# Patient Record
Sex: MALE | Race: WHITE | URBAN - METROPOLITAN AREA
[De-identification: names, ages, dates, MRNs, and addresses within clinical notes are randomized per-mention and may not be internally consistent; named-entity substitution may affect disease eponyms.]

---

## 2017-05-04 DIAGNOSIS — C07 CARCINOMA OF PAROTID GLAND (H): Primary | ICD-10-CM

## 2017-06-20 NOTE — PROGRESS NOTES
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  Dr Booker then  brought him back to the operating room 3 days later for a formal parotidectomy and node sampling.  Four nodes were removed, and all four were negative.  There was no residual tumor in the parotid.  The tumor was, therefore, staged as a T2, N0 low-grade acinic cell carcinoma, and he did not have adjuvant therapy.       HISTORY OF PRESENT ILLNESS:  He is now here for a surveillance visit.  Nine month scans are negative.  There is a 3 mm nodule in the right upper apex of the lung that is \"stable\".  We talked about what that meant and that stable is good.  He, however, has noticed a new small lymph node in the left posterior neck that has been present for about four months.  He did not seek attention for it, and we talked about perhaps next time letting us know a little sooner.  Fortunately, it has not grown substantially and does not bother him.      PHYSICAL EXAMINATION:  He looks great.  The facial nerve looks much better; perhaps II/IV weakness now in the marginal distribution and it does not bother him.  The parotid has no palpable nodes.  The mouth is clear.  The neck has a 5-7 mm mobile soft node in mid-level V just posterior to the SCM.  It is definitely palpable but feels benign.  The rest of the jerson basin is negative.  His incisions have healed very nicely.      IMPRESSION AND PLAN:  Mr. Ramirez is about one year status post excision of a low-grade acinic carcinoma with close margins in the left neck.  He has done very well and does not seem to have any evidence of tumor on the nine month scans or my examination.  There is, however, a little focus of adenopathy and he would feel more comfortable having that needled.      It feels benign to me, but " it of course is worrisome for him.  We will see if we can get this done with a cytopathology team today.  If it is too small, we will need to reschedule with an ultrasound-guided biopsy.  If further problems develop, he needs to let me know right away so that we can get him seen by one of my colleagues in [place].      ADDENDUM:  The cytopathology team was able to aspirate the node and the preliminary read is negative, and c/w a benign node, but we will await the final results in 1-2 working days.    Ernesto Jensen M.D.  Otolaryngology/Head & Neck Surgery  [phone]                MD Panfilo David, [facility] Ear, Nose & Throat [address], [address]  [place] [address]      Jermaine Scott MD

## 2017-06-23 ENCOUNTER — OFFICE VISIT (OUTPATIENT)
Dept: OTOLARYNGOLOGY | Facility: CLINIC | Age: 22
End: 2017-06-23

## 2017-06-23 VITALS — BODY MASS INDEX: 21.33 KG/M2 | WEIGHT: 144 LBS | HEIGHT: 69 IN

## 2017-06-23 DIAGNOSIS — C80.1: ICD-10-CM

## 2017-06-23 DIAGNOSIS — R59.9 ENLARGED LYMPH NODES: Primary | ICD-10-CM

## 2017-06-23 DIAGNOSIS — C07 CARCINOMA OF PAROTID GLAND (H): ICD-10-CM

## 2017-06-23 ASSESSMENT — PAIN SCALES - GENERAL: PAINLEVEL: NO PAIN (0)

## 2017-06-23 NOTE — MR AVS SNAPSHOT
After Visit Summary   6/23/2017    Kingston Ramirez    MRN: [id number]           Patient Information     Date Of Birth          1995        Visit Information        Provider Department      6/23/2017 3:30 PM Ernesto Jensen MD [facility] Ear Nose and Throat        Today's Diagnoses     Enlarged lymph nodes    -  1    Carcinoma of parotid gland (H)        Acinic cell adenocarcinoma (H)          Care Instructions    1.  You were seen in the ENT Clinic today by Dr. Jensen.  If you have any questions or concerns after your appointment, please call [phone].  Press option #1 for scheduling related needs.  Press option #3 for Nurse advice.    Chantel DECKER, RN  [facility] ENT   Head & Neck Surgery               Follow-ups after your visit        Who to contact     Please call your clinic at [phone] to:    Ask questions about your health    Make or cancel appointments    Discuss your medicines    Learn about your test results    Speak to your doctor   If you have compliments or concerns about an experience at your clinic, or if you wish to file a complaint, please contact [facility] Physicians Patient Relations at [phone] or email us at [email or web address]         Additional Information About Your Visit        MyCharmobli Information     [employer] gives you secure access to your electronic health record. If you see a primary care provider, you can also send messages to your care team and make appointments. If you have questions, please call your primary care clinic.  If you do not have a primary care provider, please call [phone] and they will assist you.      [employer] is an electronic gateway that provides easy, online access to your medical records. With CAR[employer], you can request a clinic appointment, read your test results, renew a prescription or communicate with your care team.     To access your existing account, please contact your [facility]  "[place] [facility] or call [phone] for assistance.        Care EveryWhere ID     This is your Care EveryWhere ID. This could be used by other organizations to access your [place] medical records  [id number]        Your Vitals Were     Height BMI (Body Mass Index)                1.753 m (5' 9.02\") 21.26 kg/m2           Blood Pressure from Last 3 Encounters:   No data found for BP    Weight from Last 3 Encounters:   06/23/17 65.3 kg (144 lb) (31 %)*   11/23/16 64.4 kg (142 lb) (30 %)*     * Growth percentiles are based on CDC 2-20 Years data.              We Performed the Following     Fine needle aspiration        Primary Care Provider    None Specified       No primary provider on file.        Equal Access to Services     NERIS MENDOZA : Tuyet Diaz, waondinada juwanadaha, qaybta kaalmada cely, david posadas . So [facility] [phone].    ATENCIÓN: Si habla español, tiene a colvin disposición servicios gratuitos de asistencia lingüística. Llame al [phone].    We comply with applicable federal civil rights laws and [place] laws. We do not discriminate on the basis of race, color, national origin, age, disability sex, sexual orientation or gender identity.            Thank you!     Thank you for choosing [facility] EAR NOSE AND THROAT  for your care. Our goal is always to provide you with excellent care. Hearing back from our patients is one way we can continue to improve our services. Please take a few minutes to complete the written survey that you may receive in the mail after your visit with us. Thank you!             Your Updated Medication List - Protect others around you: Learn how to safely use, store and throw away your medicines at www.disposemymeds.org.      Notice  As of 6/23/2017 11:59 PM    You have not been prescribed any medications.      "

## 2017-06-23 NOTE — PATIENT INSTRUCTIONS
1.  You were seen in the ENT Clinic today by Dr. Jensen.  If you have any questions or concerns after your appointment, please call [phone].  Press option #1 for scheduling related needs.  Press option #3 for Nurse advice.    Chantel RANGELN, RN  [facility] ENT   Head & Neck Surgery

## 2017-06-23 NOTE — LETTER
"6/23/2017       RE: Kingston Ramirez  [address] [address]     Dear Colleague,    Thank you for referring your patient, Kingston Ramirez, to the [facility] EAR NOSE AND THROAT at [facility]. Please see a copy of my visit note below.    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  Dr Booker then  brought him back to the operating room 3 days later for a formal parotidectomy and node sampling.  Four nodes were removed, and all four were negative.  There was no residual tumor in the parotid.  The tumor was, therefore, staged as a T2, N0 low-grade acinic cell carcinoma, and he did not have adjuvant therapy.       HISTORY OF PRESENT ILLNESS:  He is now here for a surveillance visit.  Nine month scans are negative.  There is a 3 mm nodule in the right upper apex of the lung that is \"stable\".  We talked about what that meant and that stable is good.  He, however, has noticed a new small lymph node in the left posterior neck that has been present for about four months.  He did not seek attention for it, and we talked about perhaps next time letting us know a little sooner.  Fortunately, it has not grown substantially and does not bother him.      PHYSICAL EXAMINATION:  He looks great.  The facial nerve looks much better; perhaps II/IV weakness now in the marginal distribution and it does not bother him.  The parotid has no palpable nodes.  The mouth is clear.  The neck has a 5-7 mm mobile soft node in mid-level V just posterior to the SCM.  It is definitely palpable but feels benign.  The rest of the jerson basin is negative.  His incisions have healed very nicely.      IMPRESSION AND PLAN:  Mr. Ramirez is about one year status post excision of a low-grade acinic " carcinoma with close margins in the left neck.  He has done very well and does not seem to have any evidence of tumor on the nine month scans or my examination.  There is, however, a little focus of adenopathy and he would feel more comfortable having that needled.      It feels benign to me, but it of course is worrisome for him.  We will see if we can get this done with a cytopathology team today.  If it is too small, we will need to reschedule with an ultrasound-guided biopsy.  If further problems develop, he needs to let me know right away so that we can get him seen by one of my colleagues in [place].      ADDENDUM:  The cytopathology team was able to aspirate the node and the preliminary read is negative, and c/w a benign node, but we will await the final results in 1-2 working days.    Ernesto Jensen M.D.  Otolaryngology/Head & Neck Surgery  [phone]      MD Panfilo David, [facility] Ear, Nose & Throat [address], [address]  [place] [address]      Jermaine Scott MD

## 2017-06-23 NOTE — NURSING NOTE
Chief Complaint   Patient presents with     RECHECK     7 month f/u       Milla Mason Medical Assistant

## 2017-06-28 LAB — COPATH REPORT: NORMAL

## 2017-11-07 DIAGNOSIS — C76.0 HEAD AND NECK CANCER (H): Primary | ICD-10-CM

## 2017-12-19 ENCOUNTER — RADIANT APPOINTMENT (OUTPATIENT)
Dept: CT IMAGING | Facility: CLINIC | Age: 22
End: 2017-12-19
Attending: OTOLARYNGOLOGY
Payer: COMMERCIAL

## 2017-12-19 DIAGNOSIS — C76.0 HEAD AND NECK CANCER (H): ICD-10-CM

## 2017-12-19 RX ORDER — IOPAMIDOL 755 MG/ML
70 INJECTION, SOLUTION INTRAVASCULAR ONCE
Status: COMPLETED | OUTPATIENT
Start: 2017-12-19 | End: 2017-12-19

## 2017-12-19 RX ADMIN — IOPAMIDOL 70 ML: 755 INJECTION, SOLUTION INTRAVASCULAR at 18:07

## 2017-12-20 ENCOUNTER — OFFICE VISIT (OUTPATIENT)
Dept: OTOLARYNGOLOGY | Facility: CLINIC | Age: 22
End: 2017-12-20
Payer: COMMERCIAL

## 2017-12-20 ENCOUNTER — CARE COORDINATION (OUTPATIENT)
Dept: OTOLARYNGOLOGY | Facility: CLINIC | Age: 22
End: 2017-12-20

## 2017-12-20 VITALS — WEIGHT: 144 LBS | BODY MASS INDEX: 21.33 KG/M2 | HEIGHT: 69 IN

## 2017-12-20 DIAGNOSIS — C80.1: Primary | ICD-10-CM

## 2017-12-20 ASSESSMENT — PAIN SCALES - GENERAL: PAINLEVEL: NO PAIN (0)

## 2017-12-20 NOTE — MR AVS SNAPSHOT
"              After Visit Summary   12/20/2017    Kingston Ramirez    MRN: [id number]           Patient Information     Date Of Birth          1995        Visit Information        Provider Department      12/20/2017 7:20 AM Ernesto Jensen MD [facility] Ear Nose and Throat        Today's Diagnoses     Acinic cell adenocarcinoma (H)    -  1       Follow-ups after your visit        Who to contact     Please call your clinic at [phone] to:    Ask questions about your health    Make or cancel appointments    Discuss your medicines    Learn about your test results    Speak to your doctor   If you have compliments or concerns about an experience at your clinic, or if you wish to file a complaint, please contact [facility] Physicians Patient Relations at [phone] or email us at [email or web address]         Additional Information About Your Visit        MyChart Information     [employer] gives you secure access to your electronic health record. If you see a primary care provider, you can also send messages to your care team and make appointments. If you have questions, please call your primary care clinic.  If you do not have a primary care provider, please call [phone] and they will assist you.      [employer] is an electronic gateway that provides easy, online access to your medical records. With [employer], you can request a clinic appointment, read your test results, renew a prescription or communicate with your care team.     To access your existing account, please contact your [facility] Clinic or call [phone] for assistance.        Care EveryWhere ID     This is your Care EveryWhere ID. This could be used by other organizations to access your [place] medical records  [id number]        Your Vitals Were     Height BMI (Body Mass Index)                1.753 m (5' 9.02\") 21.26 kg/m2           Blood Pressure from Last 3 Encounters:   No data found for BP    " Weight from Last 3 Encounters:   12/20/17 65.3 kg (144 lb)   06/23/17 65.3 kg (144 lb) (31 %)*   11/23/16 64.4 kg (142 lb) (30 %)*     * Growth percentiles are based on [facility] 2-20 Years data.              Today, you had the following     No orders found for display       Primary Care Provider    None Specified       No primary provider on file.        Equal Access to Services     CHRISTIAN Alliance HospitalCOOKIE : Hadii abhijit ku jesso Soelali, waaxda luqadaha, qaybta kaalmada adeegyada, david coronafabianabaldo posadas . So [facility] [phone].    ATENCIÓN: Si habla español, tiene a colvin disposición servicios gratuitos de asistencia lingüística. Llame al [phone].    We comply with applicable federal civil rights laws and [place] laws. We do not discriminate on the basis of race, color, national origin, age, disability, sex, sexual orientation, or gender identity.            Thank you!     Thank you for choosing [facility] EAR NOSE AND THROAT  for your care. Our goal is always to provide you with excellent care. Hearing back from our patients is one way we can continue to improve our services. Please take a few minutes to complete the written survey that you may receive in the mail after your visit with us. Thank you!             Your Updated Medication List - Protect others around you: Learn how to safely use, store and throw away your medicines at www.disposemymeds.org.      Notice  As of 12/20/2017  8:05 AM    You have not been prescribed any medications.

## 2017-12-20 NOTE — NURSING NOTE
Chief Complaint   Patient presents with     RECHECK     follow up after ct     Milla Mason Medical

## 2017-12-20 NOTE — LETTER
12/20/2017       RE: Kingston Ramirez  [address] [address]     Dear Colleague,    Thank you for referring your patient, Kingston Ramirez, to the [facility] EAR NOSE AND THROAT at [facility]. Please see a copy of my visit note below.    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  Dr Booker then  brought him back to the operating room 3 days later for a formal parotidectomy and node sampling.  Four nodes were removed, and all four were negative.  There was no residual tumor in the parotid.  The tumor was, therefore, staged as a T2, N0 low-grade acinic cell carcinoma, and he did not have adjuvant therapy.       HISTORY OF PRESENT ILLNESS:  Mr. Ramirez has just returned home for the holidays.  He has been feeling generally well.  There has been no weight loss, no fevers, no chills, no sweats.  He notes no new lumps or bumps.  He is swallowing well and has no pain.     He underwent a CT scan of his neck and chest late last night, but the reports are not yet available.  I looked at the CT of the neck myself.  I see some benign adenopathy but no evidence of worrisome jerson involvement.  I did inform him and his mother that we need to wait until the radiologist have interpreted, of course.    PHYSICAL EXAMINATION:  He is with his mom.  Looks great, and older than stated age.  The left parotid has no palpable nodes.  The neck has no palpable adenopathy.  Incisions healed.       IMPRESSION AND PLAN:    Mr. Ramirez is about 1.5 year status post excision of a low-grade acinic carcinoma with close margins in the left neck by Dr. Weaver.  He continues to do well and is SEBLE.      We will await the formal read of the CT and call him with results.  We will arrange an  annual scan assuming all is well.  We talked about switching to MRI and he agrees; Elenita Barrett will arrange.     Ernesto Jensen M.D.  Otolaryngology/Head & Neck Surgery  [phone]        MD Panfilo David, [facility] Ear, Nose & Throat [address], [address]  [place] [address]      Jermaine Scott MD

## 2017-12-20 NOTE — PROGRESS NOTES
Dr. Jensen reviewed Mack final CT neck and chest results  Kingston was called and notified of the results

## 2017-12-20 NOTE — PROGRESS NOTES
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  Dr Booker then  brought him back to the operating room 3 days later for a formal parotidectomy and node sampling.  Four nodes were removed, and all four were negative.  There was no residual tumor in the parotid.  The tumor was, therefore, staged as a T2, N0 low-grade acinic cell carcinoma, and he did not have adjuvant therapy.       HISTORY OF PRESENT ILLNESS:  Mr. Ramirez has just returned home for the holidays.  He has been feeling generally well.  There has been no weight loss, no fevers, no chills, no sweats.  He notes no new lumps or bumps.  He is swallowing well and has no pain.     He underwent a CT scan of his neck and chest late last night, but the reports are not yet available.  I looked at the CT of the neck myself.  I see some benign adenopathy but no evidence of worrisome jerson involvement.  I did inform him and his mother that we need to wait until the radiologist have interpreted, of course.    PHYSICAL EXAMINATION:  He is with his mom.  Looks great, and older than stated age.  The left parotid has no palpable nodes.  The neck has no palpable adenopathy.  Incisions healed.       IMPRESSION AND PLAN:    Mr. Ramirez is about 1.5 year status post excision of a low-grade acinic carcinoma with close margins in the left neck by Dr. Weaver.  He continues to do well and is SEBLE.      We will await the formal read of the CT and call him with results.  We will arrange an annual scan assuming all is well.  We talked about switching to MRI and he agrees; Elenita Barrett will arrange.     Ernesto Jensen M.D.  Otolaryngology/Head & Neck Surgery  [phone]                MD Panfilo David, [facility] Ear, Nose & Throat [facility]  [address], [address]  [place] [address]      Jermaine Scott MD

## 2017-12-20 NOTE — PATIENT INSTRUCTIONS
Ct neck and chest done today   You will be called with the results  Follow up in 1 year with a MRI neck  Call me if ?'s arise [phone]  Elenita Barrett RN

## 2017-12-20 NOTE — DISCHARGE INSTRUCTIONS

## 2018-01-07 ENCOUNTER — HEALTH MAINTENANCE LETTER (OUTPATIENT)
Age: 23
End: 2018-01-07

## 2018-10-03 ENCOUNTER — TELEPHONE (OUTPATIENT)
Dept: OTOLARYNGOLOGY | Facility: CLINIC | Age: 23
End: 2018-10-03

## 2018-10-03 NOTE — TELEPHONE ENCOUNTER
Email received from patient asking if CT scan and MRI  scans can be done in [place] due to insurance. Responded to email stating yes, but we would like it done a few weeks prior to ENT follow up to ensure that images get here by the appointment, waiting on response from patient to see where he would like orders faxed to.

## 2018-11-09 ENCOUNTER — TRANSFERRED RECORDS (OUTPATIENT)
Dept: HEALTH INFORMATION MANAGEMENT | Facility: CLINIC | Age: 23
End: 2018-11-09

## 2018-11-13 ENCOUNTER — TELEPHONE (OUTPATIENT)
Dept: OTOLARYNGOLOGY | Facility: CLINIC | Age: 23
End: 2018-11-13

## 2018-11-13 NOTE — TELEPHONE ENCOUNTER
Phone Call:     Contact Name [facility] imaging   Outcome 11/9/18 CT Chest and MRI Neck reports were faxed to the clinic, sent a fax via [employer] to [facility] to mail out images via [employer]     11/15/18 8:55AM received disc of images, sent to Film room to upload in PACS - Amay

## 2018-12-19 ENCOUNTER — OFFICE VISIT (OUTPATIENT)
Dept: OTOLARYNGOLOGY | Facility: CLINIC | Age: 23
End: 2018-12-19
Payer: COMMERCIAL

## 2018-12-19 VITALS — WEIGHT: 146 LBS | HEIGHT: 69 IN | BODY MASS INDEX: 21.62 KG/M2

## 2018-12-19 DIAGNOSIS — C80.1: Primary | ICD-10-CM

## 2018-12-19 ASSESSMENT — MIFFLIN-ST. JEOR: SCORE: 1657.63

## 2018-12-19 ASSESSMENT — PAIN SCALES - GENERAL: PAINLEVEL: NO PAIN (0)

## 2018-12-19 NOTE — LETTER
12/19/2018       RE: Kingston Ramirez  [address] [address]     Dear Colleague,    Thank you for referring your patient, Kingston Ramirez, to the [facility] EAR NOSE AND THROAT at [facility]. Please see a copy of my visit note below.    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  Dr Booker then  brought him back to the operating room 3 days later for a formal parotidectomy and node sampling.  Four nodes were removed, and all four were negative.  There was no residual tumor in the parotid.  The tumor was, therefore, staged as a T2, N0 low-grade acinic cell carcinoma, and he did not have adjuvant therapy.       HISTORY OF PRESENT ILLNESS:  Monica Ramirez is home for the holidays again.  He had CT scans done in [place] of the neck and the chest that were clear, and the report suggests no evidence of lesions.  He has now graduated and is looking for a job as a  here in the [place].  He brings his girlfriend from [place] with him today.      He is doing well.  His only issue is that he notices a small bump over his sternocleidomastoid muscle that has not changed.  He thinks it has been there for a long time.  He wanted me to just take a feel.       PHYSICAL EXAMINATION:    He is with his girlfriend today.  He looks just fine and is very comfortable.  The left parotid bed has no palpable masses.  There is a small 2.0 x 3.0 mm area of slight firmness over the SCM just behind the incision line on the left.  It is perhaps a centimeter or two below the angle of the mandible.  It feels like a suture where the external jugular could be tied off.  There are no other palpable nodes.      IMPRESSION:  I have informed  Mr. Ramirez that his CT scans are clear.  His examination is clear to me.        PLAN:  We will need to watch that area over his left SCM just to make sure that it does not progress.  He will feel it every two to three weeks and let me know if it grows.  Assuming it does not, we will see him back in a year with a CT scan in November.  He anticipates moving back to the area, and I'd like to have him scanned here so that it's easier for us to obtain films.    Once his MRI from [place] is received, I will review it with our radiologist here.     If he and his girlfriend move to another area, he will let me know so that we can find him a head and neck oncologist to follow him.     BY/ms   Ernesto Jensen M.D.  Otolaryngology/Head & Neck Surgery  [phone]                MD Panfilo David, [facility] Ear, Nose & Throat [address], [address]  [place] [address]      Jermaine Scott MD              Again, thank you for allowing me to participate in the care of your patient.      Sincerely,    Ernesto Jensen MD

## 2018-12-19 NOTE — PATIENT INSTRUCTIONS
1. Please follow-up in clinic in 1 year and have CT at this time.  2. Please call the ENT clinic with any questions,concerns, new or worsening symptoms.    -Clinic number is [phone]   - [facility]'s direct line (Dr. Jensen's nurse) [phone]

## 2018-12-20 DIAGNOSIS — C80.1 ACINIC CELL CARCINOMA (H): Primary | ICD-10-CM

## 2020-03-10 ENCOUNTER — HEALTH MAINTENANCE LETTER (OUTPATIENT)
Age: 25
End: 2020-03-10

## 2020-10-28 ENCOUNTER — TELEPHONE (OUTPATIENT)
Dept: OTOLARYNGOLOGY | Facility: CLINIC | Age: 25
End: 2020-10-28

## 2020-11-16 ENCOUNTER — OFFICE VISIT (OUTPATIENT)
Dept: OTOLARYNGOLOGY | Facility: CLINIC | Age: 25
End: 2020-11-16
Payer: COMMERCIAL

## 2020-11-16 VITALS
OXYGEN SATURATION: 96 % | BODY MASS INDEX: 22.51 KG/M2 | HEIGHT: 69 IN | WEIGHT: 152 LBS | TEMPERATURE: 98.1 F | HEART RATE: 62 BPM | DIASTOLIC BLOOD PRESSURE: 76 MMHG | SYSTOLIC BLOOD PRESSURE: 131 MMHG

## 2020-11-16 DIAGNOSIS — C07 MALIGNANT NEOPLASM OF PAROTID GLAND (H): Primary | ICD-10-CM

## 2020-11-16 PROCEDURE — 99214 OFFICE O/P EST MOD 30 MIN: CPT | Performed by: STUDENT IN AN ORGANIZED HEALTH CARE EDUCATION/TRAINING PROGRAM

## 2020-11-16 ASSESSMENT — MIFFLIN-ST. JEOR: SCORE: 1674.85

## 2020-11-16 ASSESSMENT — PAIN SCALES - GENERAL: PAINLEVEL: NO PAIN (0)

## 2020-11-16 NOTE — PATIENT INSTRUCTIONS
1. Please follow-up in clinic in 6 months.   2. Please call the ENT clinic with any questions,concerns, new or worsening symptoms.    -Clinic number is [phone]   - [facility]'s direct line (Dr Jordan's nurse) [phone]

## 2020-11-16 NOTE — LETTER
11/16/2020       RE: Kingston Ramirez  [address] [address]     Dear Colleague,    Thank you for referring your patient, Kingston Ramirez, to the [facility] EAR NOSE AND THROAT CLINIC [place] at [facility]. Please see a copy of my visit note below.    Dear Dr. Jensen:    I had the pleasure of meeting Kingston Ramirez in consultation today at the [facility] Otolaryngology [facility] at your request.     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 He underwent a 8/11/2016 excision of a left neck mass of 2x2 cm by Dr. 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  Dr Booker then  brought him back to the operating room 3 days later for a formal parotidectomy and node sampling.  Four nodes were removed, and all four were negative.  There was no residual tumor in the parotid.  The tumor was, therefore, staged as a T2, N0 low-grade acinic cell carcinoma, and he did not have adjuvant therapy.       He was a student previously in [place] and had follow up visits when he was back in [facility]. He has now moved to [place]. His last follow up was 2 weeks ago.    He has no complaints today. No new neck masses or parotid masses. No facial pain or weakness. He has noticed a bump in his floor of mouth that has been stable for the last year but wants me to look at it.     PAST MEDICAL HISTORY:   None     PAST SURGICAL HISTORY:   Past Surgical History:   Procedure Laterality Date     ENT SURGERY  08/11/2016    partial perotidectomy     HEAD & NECK SURGERY  08/09/2016    excised acinic cell carcinoma       MEDICATIONS:     None    ALLERGIES:    Bactrim  Biotin    HABITS/SOCIAL HISTORY:   Nonsmoker  Occasional ETOH    FAMILY HISTORY:    Family History   Problem Relation Age of Onset     Cancer Paternal Grandfather      "    Brain Tumor, Fatal, ~Age 45     Heart Disease Maternal Grandfather         Heart Attack, ~Age 42     Heart Disease Maternal Grandmother         Double Bypass Surgery, ~Age 70     Dementia Paternal Grandmother         Alzheimer/Dementia, Onset ~Age 70     Migraines Brother        REVIEW OF SYSTEMS:  12 point ROS was negative other than the symptoms noted above in the HPI.  Patient Supplied Answers to Review of Systems  [facility] 12/19/2018   Neurology -   Ears, Nose, Throat -   Allergy/Immunology Allergies or hay fever       PHYSICAL EXAMINATION:   /76   Pulse 62   Temp 98.1  F (36.7  C)   Ht 1.753 m (5' 9\")   Wt 68.9 kg (152 lb)   SpO2 96%   BMI 22.45 kg/m     Alert, NAD  In good spirits  Normocephalic, atraumatic  EOMs intact  Normal auricles bilaterally  No anterior nasal drainage  No lesions in oral cavity. Bump he wanted me to look at appears to be submandibular duct. Nothing concerning. No oropharyngeal lesions  No palpable lymphadenopathy  CN II-XII intact    RESULTS REVIEWED:   I have reviewed Dr. Jensen's notes    IMPRESSION AND PLAN:   25 year old male with history of low grade acinic cell carcinoma of the left parotid gland removed in 8/2016. He is doing well today without signs of disease recurrence. He is now back to living in [place] and I think we should get him back on a more regular follow up schedule. Id like to see him back in 6 months for routine follow up.     Thank you very much for the opportunity to participate in the care of your patient.      Jovani Jordan MD  Otolaryngology- Head & Neck Surgery        "

## 2020-11-16 NOTE — NURSING NOTE
"Chief Complaint   Patient presents with     RECHECK     follow up      Blood pressure 131/76, pulse 62, temperature 98.1  F (36.7  C), height 1.753 m (5' 9\"), weight 68.9 kg (152 lb), SpO2 96 %.    Orlando Rossi LPN    "

## 2020-11-17 NOTE — PROGRESS NOTES
Dear Dr. Jensen:    I had the pleasure of meeting Kingston Ramierz in consultation today at the [facility] Otolaryngology Clinic at your request.     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 He underwent a 8/11/2016 excision of a left neck mass of 2x2 cm by Dr. 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  Dr Booker then  brought him back to the operating room 3 days later for a formal parotidectomy and node sampling.  Four nodes were removed, and all four were negative.  There was no residual tumor in the parotid.  The tumor was, therefore, staged as a T2, N0 low-grade acinic cell carcinoma, and he did not have adjuvant therapy.       He was a student previously in [place] and had follow up visits when he was back in [facility]. He has now moved to [place]. His last follow up was 2 weeks ago.    He has no complaints today. No new neck masses or parotid masses. No facial pain or weakness. He has noticed a bump in his floor of mouth that has been stable for the last year but wants me to look at it.     PAST MEDICAL HISTORY:   None     PAST SURGICAL HISTORY:   Past Surgical History:   Procedure Laterality Date     ENT SURGERY  08/11/2016    partial perotidectomy     HEAD & NECK SURGERY  08/09/2016    excised acinic cell carcinoma       MEDICATIONS:     None    ALLERGIES:    Bactrim  Biotin    HABITS/SOCIAL HISTORY:   Nonsmoker  Occasional ETOH    FAMILY HISTORY:    Family History   Problem Relation Age of Onset     Cancer Paternal Grandfather         Brain Tumor, Fatal, ~Age 45     Heart Disease Maternal Grandfather         Heart Attack, ~Age 42     Heart Disease Maternal Grandmother         Double Bypass Surgery, ~Age 70     Dementia Paternal Grandmother         Alzheimer/Dementia, Onset ~Age 70     Migraines Brother        REVIEW OF SYSTEMS:  12 point ROS was  "negative other than the symptoms noted above in the HPI.  Patient Supplied Answers to Review of Systems  [facility] ROS 12/19/2018   Neurology -   Ears, Nose, Throat -   Allergy/Immunology Allergies or hay fever       PHYSICAL EXAMINATION:   /76   Pulse 62   Temp 98.1  F (36.7  C)   Ht 1.753 m (5' 9\")   Wt 68.9 kg (152 lb)   SpO2 96%   BMI 22.45 kg/m     Alert, NAD  In good spirits  Normocephalic, atraumatic  EOMs intact  Normal auricles bilaterally  No anterior nasal drainage  No lesions in oral cavity. Bump he wanted me to look at appears to be submandibular duct. Nothing concerning. No oropharyngeal lesions  No palpable lymphadenopathy  CN II-XII intact    RESULTS REVIEWED:   I have reviewed Dr. Jensen's notes    IMPRESSION AND PLAN:   25 year old male with history of low grade acinic cell carcinoma of the left parotid gland removed in 8/2016. He is doing well today without signs of disease recurrence. He is now back to living in [place] and I think we should get him back on a more regular follow up schedule. Id like to see him back in 6 months for routine follow up.     Thank you very much for the opportunity to participate in the care of your patient.      Jovani Jordan MD  Otolaryngology- Head & Neck Surgery      "

## 2020-12-27 ENCOUNTER — HEALTH MAINTENANCE LETTER (OUTPATIENT)
Age: 25
End: 2020-12-27

## 2021-04-24 ENCOUNTER — HEALTH MAINTENANCE LETTER (OUTPATIENT)
Age: 26
End: 2021-04-24

## 2021-07-15 ENCOUNTER — TELEPHONE (OUTPATIENT)
Dept: OTOLARYNGOLOGY | Facility: CLINIC | Age: 26
End: 2021-07-15

## 2021-07-15 NOTE — TELEPHONE ENCOUNTER
DAVINAM after being unable to reach patient on multiple attempts. Left ENT scheduling number for patient to call if appointment change does not work for him. Stated appointment with Dr. Jordan has been moved to 1:40 on 07/26 from 2:20 on 07/26.

## 2021-07-26 ENCOUNTER — OFFICE VISIT (OUTPATIENT)
Dept: OTOLARYNGOLOGY | Facility: CLINIC | Age: 26
End: 2021-07-26
Payer: COMMERCIAL

## 2021-07-26 VITALS
TEMPERATURE: 97.7 F | WEIGHT: 153.66 LBS | HEIGHT: 69 IN | BODY MASS INDEX: 22.76 KG/M2 | HEART RATE: 50 BPM | OXYGEN SATURATION: 99 %

## 2021-07-26 DIAGNOSIS — Z85.818 HISTORY OF MALIGNANT NEOPLASM OF PAROTID GLAND: Primary | ICD-10-CM

## 2021-07-26 PROCEDURE — 99214 OFFICE O/P EST MOD 30 MIN: CPT | Performed by: STUDENT IN AN ORGANIZED HEALTH CARE EDUCATION/TRAINING PROGRAM

## 2021-07-26 ASSESSMENT — PAIN SCALES - GENERAL: PAINLEVEL: NO PAIN (0)

## 2021-07-26 ASSESSMENT — MIFFLIN-ST. JEOR: SCORE: 1677.38

## 2021-07-26 NOTE — LETTER
Date:July 27, 2021      Patient was self referred, no letter generated. Do not send.        [facility] Health Information

## 2021-07-26 NOTE — LETTER
"7/26/2021       RE: Kingston Ramirez  [address] [facility] [address]     Dear Colleague,    Thank you for referring your patient, Kingston Ramirez, to the [facility] EAR NOSE AND THROAT CLINIC [place] at [facility]. Please see a copy of my visit note below.    Head and Neck Surgery  7/26/2021    Diagnosis: T2N0 Low grade acinic cell carcinoma left parotid    Treatment: He underwent on 8/11/2016 an excision of a left neck mass of 2x2 cm by Dr. 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  Dr Booker then  brought him back to the operating room 3 days later for a formal parotidectomy and node sampling.  Four nodes were removed, and all four were negative.  There was no residual tumor in the parotid.    Imaging:  Most recent imaging 11/2018 MRI neck clear, ct chest clear    Interval History:  He has no complaints today. No new masses. No HN symptoms.    Physical Examination:  Pulse 50   Temp 97.7  F (36.5  C) (Temporal)   Ht 1.753 m (5' 9\")   Wt 69.7 kg (153 lb 10.6 oz)   SpO2 99%   BMI 22.69 kg/m    Alert, NAD  Facial nerve function normal  No palpable parotid or neck masses  No lesions in oral cavity or oropharynx    A/P: Doing well without signs of disease recurrence. He is just shy of 5 years out from his treatment. I discussed with him that we typically follow patients for 5 years and after this Id be happy to see him back once a year for an exam or we can transition to a PRN basis. I discussed symptoms to watch for including new masses, facial weakness/paralysis, pain in the cheek. He will let me know how he would like to follow up.    Jovani Jordan MD      30 minutes spent on the date of the encounter in chart review, patient visit, review of tests, documentation and/or discussion with other providers about the issues documented above.         Again, thank you for allowing me to " participate in the care of your patient.      Sincerely,    Jovani Jordan MD

## 2021-07-26 NOTE — PROGRESS NOTES
"Head and Neck Surgery  7/26/2021    Diagnosis: T2N0 Low grade acinic cell carcinoma left parotid    Treatment: He underwent on 8/11/2016 an excision of a left neck mass of 2x2 cm by Dr. 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  Dr Booker then  brought him back to the operating room 3 days later for a formal parotidectomy and node sampling.  Four nodes were removed, and all four were negative.  There was no residual tumor in the parotid.    Imaging:  Most recent imaging 11/2018 MRI neck clear, ct chest clear    Interval History:  He has no complaints today. No new masses. No HN symptoms.    Physical Examination:  Pulse 50   Temp 97.7  F (36.5  C) (Temporal)   Ht 1.753 m (5' 9\")   Wt 69.7 kg (153 lb 10.6 oz)   SpO2 99%   BMI 22.69 kg/m    Alert, NAD  Facial nerve function normal  No palpable parotid or neck masses  No lesions in oral cavity or oropharynx    A/P: Doing well without signs of disease recurrence. He is just shy of 5 years out from his treatment. I discussed with him that we typically follow patients for 5 years and after this Id be happy to see him back once a year for an exam or we can transition to a PRN basis. I discussed symptoms to watch for including new masses, facial weakness/paralysis, pain in the cheek. He will let me know how he would like to follow up.    Jovani Jordan MD      30 minutes spent on the date of the encounter in chart review, patient visit, review of tests, documentation and/or discussion with other providers about the issues documented above.     "

## 2021-10-09 ENCOUNTER — HEALTH MAINTENANCE LETTER (OUTPATIENT)
Age: 26
End: 2021-10-09

## 2022-05-16 ENCOUNTER — HEALTH MAINTENANCE LETTER (OUTPATIENT)
Age: 27
End: 2022-05-16

## 2022-09-11 ENCOUNTER — HEALTH MAINTENANCE LETTER (OUTPATIENT)
Age: 27
End: 2022-09-11

## 2023-06-03 ENCOUNTER — HEALTH MAINTENANCE LETTER (OUTPATIENT)
Age: 28
End: 2023-06-03

## 2024-07-13 ENCOUNTER — HEALTH MAINTENANCE LETTER (OUTPATIENT)
Age: 29
End: 2024-07-13